# Patient Record
Sex: MALE | Race: BLACK OR AFRICAN AMERICAN | NOT HISPANIC OR LATINO | URBAN - METROPOLITAN AREA
[De-identification: names, ages, dates, MRNs, and addresses within clinical notes are randomized per-mention and may not be internally consistent; named-entity substitution may affect disease eponyms.]

---

## 2022-04-26 ENCOUNTER — EMERGENCY (EMERGENCY)
Facility: HOSPITAL | Age: 52
LOS: 0 days | Discharge: HOME | End: 2022-04-26
Payer: COMMERCIAL

## 2022-04-26 VITALS
OXYGEN SATURATION: 100 % | TEMPERATURE: 98 F | DIASTOLIC BLOOD PRESSURE: 91 MMHG | RESPIRATION RATE: 17 BRPM | HEART RATE: 62 BPM | SYSTOLIC BLOOD PRESSURE: 136 MMHG

## 2022-04-26 VITALS
HEIGHT: 73 IN | HEART RATE: 85 BPM | DIASTOLIC BLOOD PRESSURE: 84 MMHG | RESPIRATION RATE: 18 BRPM | TEMPERATURE: 97 F | OXYGEN SATURATION: 100 % | SYSTOLIC BLOOD PRESSURE: 136 MMHG
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
## 2022-04-26 DIAGNOSIS — M54.2 CERVICALGIA: ICD-10-CM

## 2022-04-26 DIAGNOSIS — E03.9 HYPOTHYROIDISM, UNSPECIFIED: ICD-10-CM

## 2022-04-26 DIAGNOSIS — M54.50 LOW BACK PAIN, UNSPECIFIED: ICD-10-CM

## 2022-04-26 DIAGNOSIS — M25.571 PAIN IN RIGHT ANKLE AND JOINTS OF RIGHT FOOT: ICD-10-CM
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
## 2022-04-26 DIAGNOSIS — I10 ESSENTIAL (PRIMARY) HYPERTENSION: ICD-10-CM

## 2022-04-26 DIAGNOSIS — M25.572 PAIN IN LEFT ANKLE AND JOINTS OF LEFT FOOT: ICD-10-CM

## 2022-04-26 PROCEDURE — 73502 X-RAY EXAM HIP UNI 2-3 VIEWS: CPT | Mod: 26,LT

## 2022-04-26 PROCEDURE — 73610 X-RAY EXAM OF ANKLE: CPT | Mod: 26,LT

## 2022-04-26 PROCEDURE — 99284 EMERGENCY DEPT VISIT MOD MDM: CPT

## 2022-04-26 PROCEDURE — 72110 X-RAY EXAM L-2 SPINE 4/>VWS: CPT | Mod: 26

## 2022-04-26 RX ORDER — IBUPROFEN 200 MG
600 TABLET ORAL ONCE
Refills: 0 | Status: COMPLETED | OUTPATIENT
Start: 2022-04-26 | End: 2022-04-26

## 2022-04-26 RX ORDER — LIDOCAINE 4 G/100G
1 CREAM TOPICAL ONCE
Refills: 0 | Status: COMPLETED | OUTPATIENT
Start: 2022-04-26 | End: 2022-04-26

## 2022-04-26 RX ADMIN — Medication 600 MILLIGRAM(S): at 21:16

## 2022-04-26 RX ADMIN — LIDOCAINE 1 PATCH: 4 CREAM TOPICAL at 21:16
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
## 2022-04-26 NOTE — ED PROVIDER NOTE - CARE PROVIDER_API CALL
Ney Mullins)  Orthopaedic Surgery  [address], [address]  [place] [address]  Phone: [phone]  Fax: [phone]  Follow Up Time: 1-3 Days
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
## 2022-04-26 NOTE — ED PROVIDER NOTE - NEUROLOGICAL, MLM
Patient tolerated procedure well. Alert and oriented, no focal deficits, no motor or sensory deficits.
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
## 2022-04-26 NOTE — ED PROVIDER NOTE - NSFOLLOWUPINSTRUCTIONS_ED_ALL_ED_FT
Motor Vehicle Collision Injury, Adult      After a motor vehicle collision, it is common to have injuries to the head, face, arms, and body. These injuries may include:  •Cuts.      •Burns.      •Bruises.      •Sore muscles and muscle strains.      •Headaches.      You may have stiffness and soreness for the first several hours. You may feel worse after waking up the first morning after the collision. These injuries often feel worse for the first 24–48 hours. Your injuries should then begin to improve with each day. How quickly you improve often depends on:  •The severity of the collision.      •The number of injuries you have.      •The location and nature of the injuries.      •Whether you were wearing a seat belt and whether your airbag deployed.      A head injury may result in a concussion, which is a type of brain injury that can have serious effects. If you have a concussion, you should rest as told by your health care provider. You must be very careful to avoid having a second concussion.      Follow these instructions at home:    Medicines     •Take over-the-counter and prescription medicines only as told by your health care provider.      •If you were prescribed antibiotic medicine, take or apply it as told by your health care provider. Do not stop using the antibiotic even if your condition improves.        If you have a wound or a burn:    •Clean your wound or burn as told by your health care provider.  •Wash it with mild soap and water.      •Rinse it with water to remove all soap.      •Pat it dry with a clean towel. Do not rub it.      •If you were told to put an ointment or cream on the wound, do so as told by your health care provider.      •Follow instructions from your health care provider about how to take care of your wound or burn. Make sure you:  •Know when and how to change or remove your bandage (dressing). Always wash your hands with soap and water before and after you change your dressing. If soap and water are not available, use hand .      •Leave stitches (sutures), skin glue, or adhesive strips in place, if this applies. These skin closures may need to stay in place for 2 weeks or longer. If adhesive strip edges start to loosen and curl up, you may trim the loose edges. Do not remove adhesive strips completely unless your health care provider tells you to do that.      • Do not:   •Scratch or pick at the wound or burn.      •Break any blisters you may have.      •Peel any skin.        •Avoid exposing your burn or wound to the sun.      •Raise (elevate) the wound or burn above the level of your heart while you are sitting or lying down. This will help reduce pain, pressure, and swelling. If you have a wound or burn on your face, you may want to sleep with your head elevated. You may do this by putting an extra pillow under your head.    •Check your wound or burn every day for signs of infection. Check for:  •More redness, swelling, or pain.      •More fluid or blood.      •Warmth.      •Pus or a bad smell.        Activity   •Rest. Rest helps your body to heal. Make sure you:  •Get plenty of sleep at night. Avoid staying up late.      •Keep the same bedtime hours on weekends and weekdays.        •Ask your health care provider if you have any lifting restrictions. Lifting can make neck or back pain worse.      •Ask your health care provider when you can drive, ride a bicycle, or use heavy machinery. Your ability to react may be slower if you injured your head. Do not do these activities if you are dizzy.       •If you are told to wear a brace on an injured arm, leg, or other part of your body, follow instructions from your health care provider about any activity restrictions related to driving, bathing, exercising, or working.        General instructions                 •If directed, put ice on the injured areas. This can help with pain and swelling.  •Put ice in a plastic bag.      •Place a towel between your skin and the bag.      •Leave the ice on for 20 minutes, 2–3 times a day.        •Drink enough fluid to keep your urine pale yellow.      • Do not drink alcohol.      •Maintain good nutrition.      •Keep all follow-up visits as told by your health care provider. This is important.        Contact a health care provider if:    •Your symptoms get worse.      •You have neck pain that gets worse or has not improved after 1 week.      •You have signs of infection in a wound or burn.      •You have a fever.    •You have any of the following symptoms for more than 2 weeks after your motor vehicle collision:  •Lasting (chronic) headaches.      •Dizziness or balance problems.      •Nausea.      •Vision problems.      •Increased sensitivity to noise or light.      •Depression or mood swings.      •Anxiety or irritability.      •Memory problems.      •Trouble concentrating or paying attention.      •Sleep problems.      •Feeling tired all the time.          Get help right away if:  •You have:  •Numbness, tingling, or weakness in your arms or legs.      •Severe neck pain, especially tenderness in the middle of the back of your neck.      •Changes in bowel or bladder control.      •Increasing pain in any area of your body.      •Swelling in any area of your body, especially your legs.      •Shortness of breath or light-headedness.      •Chest pain.      •Blood in your urine, stool, or vomit.      •Severe pain in your abdomen or your back.      •Severe or worsening headaches.      •Sudden vision loss or double vision.        •Your eye suddenly becomes red.      •Your pupil is an odd shape or size.        Summary    •After a motor vehicle collision, it is common to have injuries to the head, face, arms, and body.      •Follow instructions from your health care provider about how to take care of a wound or burn.      •If directed, put ice on your injured areas.      •Contact a health care provider if your symptoms get worse.      •Keep all follow-up visits as told by your health care provider.      This information is not intended to replace advice given to you by your health care provider. Make sure you discuss any questions you have with your health care provider.

## 2022-04-26 NOTE — ED ADULT NURSE NOTE - RESPIRATION RHYTHM, QM
Repeat labs drawn. Pt has no additional complaints at this time.  at bedside has pt medication list - notified pharmacist.    regular

## 2022-04-26 NOTE — ED PROVIDER NOTE - PATIENT PORTAL LINK FT
You can access the FollowMyHealth Patient Portal offered by [facility] by registering at the following website: [email or web address]. By joining [employer]’s FollowMyHealth portal, you will also be able to view your health information using other applications (apps) compatible with our system.